# Patient Record
(demographics unavailable — no encounter records)

---

## 2025-05-09 NOTE — DISCUSSION/SUMMARY
[FreeTextEntry1] : In summary, the patient is a 77-year-old right-handed man with a history of Parkinson's disease. The examination was significant for right-sided bradykinesia and rigidity, with a minimal right-sided tremor. Overall, I agree with the previous evaluation and diagnosis of Parkinson's disease. While the strictly unilateral nature might give cause, the ALANA scan was also abnormal on the other side, and there were no findings of an atypical parkinsonian disorder.  Since last visit he stable motor wise but having increased concentration issues and insomnia, and fluctuations. Discussed importance of using CPAP for THERESA which is likely contributory.   1. He describes orthostasis, but BP was ok in office today.  2. Continue LD at current dose (can try to cyut to 1.5 pill). Continue ongentys (but Crexont and SQ LD are also a possiblity). Can take extra pill if needed.  3. Continue Exercise, PT. Advised to stay hydrated.  4. Advised to see sleep medicine again for CPAP. Trazodone and melatonin 5. Follow-up in 6 months   We discussed the above impression, plan and recommendations during the visit. Counseling represented more then 50% of the 30 minute visit time.

## 2025-05-09 NOTE — PHYSICAL EXAM
[Person] : oriented to person [Place] : oriented to place [Time] : oriented to time [Fluency] : fluency intact [Comprehension] : comprehension intact [Cranial Nerves Oculomotor (III)] : extraocular motion intact [Cranial Nerves Facial (VII)] : face symmetrical [Cranial Nerves Vestibulocochlear (VIII)] : hearing was intact bilaterally [Motor Handedness Right-Handed] : the patient is right hand dominant [Over the Past 2 Weeks, Have You Felt Down, Depressed, or Hopeless?] : 1.) Over the past 2 weeks, have you felt down, depressed, or hopeless? No [Over the Past 2 Weeks, Have You Felt Little Interest or Pleasure Doing Things?] : 2.) Over the past 2 weeks, have you felt little interest or pleasure doing things? No [Dysdiadochokinesia Bilaterally] : not present [Coordination - Dysmetria Impaired Finger-to-Nose Bilateral] : not present [Coordination - Dysmetria Impaired Heel-to-Shin Bilateral] : not present [FreeTextEntry1] : Last dose 1h ago, feels ON  Blink rate was mildly reduced, and voice was slightly low. Extraocular movements are intact with normal saccades, smooth pursuit, and no square wave jerks seen.   Tone was moderately increased at the right upper extremity and mildly at the right lower extremity.   Right shoulder shrug was decreased. Rapid alternating movements were moderately decreased in the right upper extremity. Foot tap was mildly impaired on the right. He easily got up from the chair without using his arms.   Gait was normal based and steady with reduced stride and reduced right arm swing. Pull test was negative.   No tremor

## 2025-05-09 NOTE — HISTORY OF PRESENT ILLNESS
[FreeTextEntry1] : The patient is a 77-year-old attended man who was diagnosed with Parkinson's disease in 2013 and has been followed at [facility] by doctors Fidelia and Jesus. His initial symptoms were slowing of the right hand. He had a Miley scan which was abnormal, and he started medications soon after. These did help, and he has no fluctuations or dyskinesias. I reviewed the Miley scan report from 12/12/2013 - near absent tracer in left putamen, decreased uptake in right putamen. MRI brain report from 11/17/2013 was unremarkable.  Since last visit,  1. Independent ADLs. Ability to concentrate is more difficult. reading for 1/2 a chapter and low attention span. It bothers him if a paper is stuck out of garbage, ?OCD tendencies.  2. He takes sinemet,  mg 2 tabs 5 times a day (7-11-3-7-11), stopped azilect and previously stopped orphenadrine (less stiff now), LD kicks in within 15-20 minutes and lasts <4hrs now. No LID. On ongentys 25 mg, doing well (on 50 mg felt too lightheaded). No LOC. No falls. Had some back pain. Was given muscle relaxant, PT 3. Movements and walking have been stable. retired in august. he is an active person. gym 2-3x/week. Walks Dog every day.  4. Sleep continues to be poor. insomnia after waking up at 2am. THERESA on CPAP. stopped using CPAP after getting COVID in 2020. He now has new machine and trying to restart, has not done yet (gets anxious). RBD sx's with talking in sleep. PMD tried. Now on trazodone 50 mg 2 at night and melatonin 10 mg 5. Still has some mild drooling and he is able to cope with it. Mild orthostasis  Past meds:  Rasagiline, orphenadrine.  Zoloft, did not tolerate. Stopped provigil (did help, but cardiologist advised against because of PVCs)

## 2025-07-22 NOTE — HISTORY OF PRESENT ILLNESS
[de-identified] : prior evaluation of thyroid nodule. cytologically benign. denies dysphagia, hoarseness or new lesions.  no changes medically since last visit. Parkinson's stable.  recent sonogram of thyroid essentially stable.   I have reviewed all old and new data and available images.

## 2025-07-22 NOTE — PHYSICAL EXAM
[de-identified] : 3.5 cm right thyroid nodule, well circumscribed and mobile [Laryngoscopy Performed] : laryngoscopy was performed, see procedure section for findings [L] : deviated to the left [Normal] : orientation to person, place, and time: normal